# Patient Record
Sex: MALE | Race: BLACK OR AFRICAN AMERICAN | NOT HISPANIC OR LATINO | URBAN - METROPOLITAN AREA
[De-identification: names, ages, dates, MRNs, and addresses within clinical notes are randomized per-mention and may not be internally consistent; named-entity substitution may affect disease eponyms.]

---

## 2018-12-17 ENCOUNTER — EMERGENCY (EMERGENCY)
Facility: HOSPITAL | Age: 43
LOS: 1 days | Discharge: HOME | End: 2018-12-17
Attending: EMERGENCY MEDICINE
Payer: SELF-PAY

## 2018-12-17 VITALS
DIASTOLIC BLOOD PRESSURE: 116 MMHG | SYSTOLIC BLOOD PRESSURE: 180 MMHG | OXYGEN SATURATION: 96 % | HEIGHT: 70 IN | RESPIRATION RATE: 20 BRPM | HEART RATE: 98 BPM | WEIGHT: 229.94 LBS | TEMPERATURE: 98 F

## 2018-12-17 PROCEDURE — 99283 EMERGENCY DEPT VISIT LOW MDM: CPT | Mod: 25

## 2018-12-17 PROCEDURE — 64400 NJX AA&/STRD TRIGEMINAL NRV: CPT | Mod: 26

## 2018-12-17 PROCEDURE — 99053 MED SERV 10PM-8AM 24 HR FAC: CPT

## 2018-12-17 RX ORDER — BUPIVACAINE HCL/PF 7.5 MG/ML
1 VIAL (ML) INJECTION ONCE
Qty: 0 | Refills: 0 | Status: COMPLETED | OUTPATIENT
Start: 2018-12-17 | End: 2018-12-17

## 2018-12-17 NOTE — ED ADULT NURSE NOTE - NSIMPLEMENTINTERV_GEN_ALL_ED
Implemented All Universal Safety Interventions:  [place] to call system. Call bell, personal items and telephone within reach. Instruct patient to call for assistance. Room bathroom lighting operational. Non-slip footwear when patient is off stretcher. Physically safe environment: no spills, clutter or unnecessary equipment. Stretcher in lowest position, wheels locked, appropriate side rails in place.

## 2018-12-17 NOTE — ED ADULT NURSE NOTE - OBJECTIVE STATEMENT
43 year old male pt presented to the ED co dental pain since last night, pt was eating and piece of his tooth cracked and fell, no bleeding to site no discharge, no swelling, pt states extreme pain to touch

## 2018-12-18 VITALS
DIASTOLIC BLOOD PRESSURE: 99 MMHG | OXYGEN SATURATION: 98 % | RESPIRATION RATE: 18 BRPM | SYSTOLIC BLOOD PRESSURE: 157 MMHG | HEART RATE: 86 BPM

## 2018-12-18 PROCEDURE — 99283 EMERGENCY DEPT VISIT LOW MDM: CPT | Mod: 25

## 2018-12-18 PROCEDURE — 64400 NJX AA&/STRD TRIGEMINAL NRV: CPT | Mod: RT

## 2018-12-18 RX ORDER — ONDANSETRON 8 MG/1
4 TABLET, FILM COATED ORAL ONCE
Qty: 0 | Refills: 0 | Status: COMPLETED | OUTPATIENT
Start: 2018-12-18 | End: 2018-12-18

## 2018-12-18 RX ORDER — PENICILLIN V POTASSIUM 250 MG
1 TABLET ORAL
Qty: 28 | Refills: 0 | OUTPATIENT
Start: 2018-12-18 | End: 2018-12-24

## 2018-12-18 RX ORDER — OXYCODONE HYDROCHLORIDE 5 MG/1
1 TABLET ORAL
Qty: 9 | Refills: 0 | OUTPATIENT
Start: 2018-12-18 | End: 2018-12-20

## 2018-12-18 RX ADMIN — Medication 1 MILLILITER(S): at 00:08

## 2018-12-18 RX ADMIN — ONDANSETRON 4 MILLIGRAM(S): 8 TABLET, FILM COATED ORAL at 00:08

## 2018-12-18 NOTE — ED PROCEDURE NOTE - CPROC ED POST PROC CARE GUIDE1
Instructed patient/caregiver regarding signs and symptoms of infection./Avoid solid food./Instructed patient/caregiver to follow-up with primary dentist./Avoid hot food./Verbal/written post procedure instructions were given to patient/caregiver.

## 2018-12-18 NOTE — ED PROVIDER NOTE - CONSTITUTIONAL, MLM
normal... Well appearing, well nourished, awake, alert, oriented to person, place, time/situation and in moderate/severe distress.

## 2018-12-18 NOTE — ED PROVIDER NOTE - OBJECTIVE STATEMENT
42 yo male with pmf of dental implants presenting with tooth pain s/p avulsion of molar tooth yesterday. He notes he was eating a biscuit last night when he heard his tooth crack and the entire crown of the tooth fell out in two pieces. He notes today he has been trying to seek care at clinics which would no accept his insurance and came in for evaluation by a dentist. he notes excruciating pain, has been taking neproxen 500mg and motrin 800mg multiple times today with no relief and +nausea/vomiting. Pt unable to eat today, no fevers, chills, recent facial trauma.

## 2018-12-18 NOTE — ED PROVIDER NOTE - NSFOLLOWUPINSTRUCTIONS_ED_ALL_ED_FT
1. follow up with dental clinic or your private dentist in 24-48 hours.  2. alternate between motrin 600mg and tylenol 650 every 4 hours as needed for pain take oxycodone 5 mg every 6 hours as needed for severe pain, take penicillin VK 500mg every 6 hours for 7 days  3. If you have fevers, chills, worsening or concerning symptoms, return to the ED.

## 2018-12-18 NOTE — ED PROVIDER NOTE - ATTENDING CONTRIBUTION TO CARE
Patient with dentaligia secondary to broken R upper molar, no fevers or chills, could not see a dentist due to insurance issues, trying NSAIDs at home with slight relief, on exam patient with generalized poor dentition and almost completely eroded R 1st vs 2nd molar (unclear due to tooth loss) - dental block by PA with resolution of symptoms, no palpable abscesses noted on exam, will start pain medications, antibiotics and refer to dental clinic for definitive management.

## 2018-12-18 NOTE — ED PROVIDER NOTE - CARE PLAN
Principal Discharge DX:	Tooth avulsion, initial encounter  Assessment and plan of treatment:	1. follow up with dental clinic or your private dentist in 24-48 hours.  2. alternate between motrin 600mg and tylenol 650 every 4 hours as needed for pain take oxycodone 5 mg every 6 hours as needed for severe pain, take penicillin VK 500mg every 6 hours for 7 days  3. If you have fevers, chills, worsening or concerning symptoms, return to the ED.

## 2018-12-18 NOTE — ED PROVIDER NOTE - PROGRESS NOTE DETAILS
pt improved s.p apical block, discussed that block will wear off in 8-12 hours and will require dental f/u tomorrow. pt notes understanding, states he needs to go to [place] for his fathers  and will be leaving tomorrow morning at 10:00am. we discussed that he needs to follow up with his outpatient dentist or to our clinic, gave return precautions for s/s infection and pain medications. He notes understanding and need to follow up with dentist for further evaluation. -Felicitas Dillard PA-C